# Patient Record
Sex: FEMALE | URBAN - NONMETROPOLITAN AREA
[De-identification: names, ages, dates, MRNs, and addresses within clinical notes are randomized per-mention and may not be internally consistent; named-entity substitution may affect disease eponyms.]

---

## 2020-08-18 ENCOUNTER — APPOINTMENT (RX ONLY)
Dept: URBAN - NONMETROPOLITAN AREA CLINIC 22 | Facility: CLINIC | Age: 20
Setting detail: DERMATOLOGY
End: 2020-08-18

## 2020-08-18 DIAGNOSIS — L663 OTHER SPECIFIED DISEASES OF HAIR AND HAIR FOLLICLES: ICD-10-CM

## 2020-08-18 DIAGNOSIS — L98.8 OTHER SPECIFIED DISORDERS OF THE SKIN AND SUBCUTANEOUS TISSUE: ICD-10-CM

## 2020-08-18 DIAGNOSIS — L73.9 FOLLICULAR DISORDER, UNSPECIFIED: ICD-10-CM

## 2020-08-18 DIAGNOSIS — L738 OTHER SPECIFIED DISEASES OF HAIR AND HAIR FOLLICLES: ICD-10-CM

## 2020-08-18 PROBLEM — L02.92 FURUNCLE, UNSPECIFIED: Status: ACTIVE | Noted: 2020-08-18

## 2020-08-18 PROCEDURE — ? COUNSELING

## 2020-08-18 PROCEDURE — ? TREATMENT REGIMEN

## 2020-08-18 PROCEDURE — ? PRESCRIPTION

## 2020-08-18 PROCEDURE — 99203 OFFICE O/P NEW LOW 30 MIN: CPT

## 2020-08-18 RX ORDER — AMMONIUM LACTATE 12 G/100G
12% CREAM TOPICAL
Qty: 1 | Refills: 11 | COMMUNITY
Start: 2020-08-18

## 2020-08-18 RX ORDER — BENZOYL PEROXIDE 100 MG/ML
10% LIQUID TOPICAL ONCE DAILY
Qty: 1 | Refills: 11 | COMMUNITY
Start: 2020-08-18

## 2020-08-18 RX ORDER — CLOBETASOL PROPIONATE 0.5 MG/ML
0.05% SOLUTION TOPICAL BID
Qty: 1 | Refills: 5 | COMMUNITY
Start: 2020-08-18

## 2020-08-18 RX ORDER — MINOCYCLINE HYDROCHLORIDE 100 MG/1
100MG TABLET ORAL DAILY
Qty: 30 | Refills: 1 | COMMUNITY
Start: 2020-08-18

## 2020-08-18 RX ADMIN — CLOBETASOL PROPIONATE 0.05%: 0.5 SOLUTION TOPICAL at 00:00

## 2020-08-18 RX ADMIN — MINOCYCLINE HYDROCHLORIDE 100MG: 100 TABLET ORAL at 00:00

## 2020-08-18 RX ADMIN — BENZOYL PEROXIDE 10%: 100 LIQUID TOPICAL at 00:00

## 2020-08-18 RX ADMIN — AMMONIUM LACTATE 12%: 12 CREAM TOPICAL at 00:00

## 2020-08-18 ASSESSMENT — LOCATION DETAILED DESCRIPTION DERM
LOCATION DETAILED: LEFT INFERIOR CENTRAL MALAR CHEEK
LOCATION DETAILED: RIGHT INFERIOR CENTRAL MALAR CHEEK
LOCATION DETAILED: RIGHT FOREHEAD
LOCATION DETAILED: LEFT CHIN
LOCATION DETAILED: NASAL SUPRATIP
LOCATION DETAILED: LEFT INFERIOR FOREHEAD

## 2020-08-18 ASSESSMENT — LOCATION SIMPLE DESCRIPTION DERM
LOCATION SIMPLE: RIGHT FOREHEAD
LOCATION SIMPLE: RIGHT CHEEK
LOCATION SIMPLE: CHIN
LOCATION SIMPLE: LEFT CHEEK
LOCATION SIMPLE: LEFT FOREHEAD
LOCATION SIMPLE: NOSE

## 2020-08-18 ASSESSMENT — LOCATION ZONE DERM
LOCATION ZONE: NOSE
LOCATION ZONE: FACE

## 2020-08-18 NOTE — PROCEDURE: TREATMENT REGIMEN
Plan: Discussed with patient that enlarged pores can occur due to excessive oily skin as the pore is working overtime to produce sebum. Informed patient that this condition can be improved but not cured. \\n\\nPatient states that she is not too insecure about her face at this time
Detail Level: Zone
Detail Level: Generalized
Plan: Discussed diagnosis in detail with patient today. Informed patient that that this condition is caused by an inflammation in the hair follicles. Informed patient that this condition can also occur due to friction from clothing or shaving to the affected area. Informed patient that these symptoms can be reduced with a topical and or oral antibiotic. \\n\\nDiscussed diagnosis in detail with patient today. Informed patient that this condition occurs in patients with sensitive skin. Informed patient that Keratosis pilaris is due to abnormal keratinisation of the lining of the hair follicle. Informed patient that this keratization fills the follicle and is retained in the hair follicle instead of exfoliating off the body. Informed patient that keratolytic creams will help to soften the keratin and cause it to be easier to wash off. Informed patient that fragrance free products and excessive moisturizing will help to reduce the symptoms.\\n\\nPatient voiced understanding and is agreeable to treatment as discussed today
Initiate Treatment: - Minocycline 100mg - 1 PO Daily (take in one week intervals as needed) \\n\\n- Clobetasol solution - AAA on the body once per day (Mix entire bottle of Clobetasol in container of Amlactin cream prior application)\\n\\n- Amlactin - AAA on the body once per day (Mix entire bottle of Clobetasol into container prior application)\\n\\n- Benzoyl Peroxide Wash - Use as a wash once per day on affected areas of the body